# Patient Record
Sex: MALE | Race: WHITE | NOT HISPANIC OR LATINO | Employment: FULL TIME | URBAN - METROPOLITAN AREA
[De-identification: names, ages, dates, MRNs, and addresses within clinical notes are randomized per-mention and may not be internally consistent; named-entity substitution may affect disease eponyms.]

---

## 2023-06-14 ENCOUNTER — HOSPITAL ENCOUNTER (EMERGENCY)
Facility: HOSPITAL | Age: 43
Discharge: OTHER INSTITUTION | End: 2023-06-14
Attending: EMERGENCY MEDICINE
Payer: OTHER MISCELLANEOUS

## 2023-06-14 ENCOUNTER — APPOINTMENT (OUTPATIENT)
Dept: GENERAL RADIOLOGY | Facility: HOSPITAL | Age: 43
End: 2023-06-14
Payer: OTHER MISCELLANEOUS

## 2023-06-14 VITALS
RESPIRATION RATE: 16 BRPM | HEIGHT: 69 IN | TEMPERATURE: 98.3 F | WEIGHT: 135 LBS | BODY MASS INDEX: 19.99 KG/M2 | HEART RATE: 74 BPM | OXYGEN SATURATION: 99 % | SYSTOLIC BLOOD PRESSURE: 128 MMHG | DIASTOLIC BLOOD PRESSURE: 98 MMHG

## 2023-06-14 DIAGNOSIS — S20.311A ABRASION OF RIGHT CHEST WALL, INITIAL ENCOUNTER: ICD-10-CM

## 2023-06-14 DIAGNOSIS — S71.111A LACERATION OF RIGHT THIGH, INITIAL ENCOUNTER: ICD-10-CM

## 2023-06-14 DIAGNOSIS — S01.81XA FACIAL LACERATION, INITIAL ENCOUNTER: Primary | ICD-10-CM

## 2023-06-14 DIAGNOSIS — S09.93XA DENTAL TRAUMA, INITIAL ENCOUNTER: ICD-10-CM

## 2023-06-14 PROCEDURE — 63710000001 ONDANSETRON ODT 4 MG TABLET DISPERSIBLE: Performed by: PHYSICIAN ASSISTANT

## 2023-06-14 PROCEDURE — 99284 EMERGENCY DEPT VISIT MOD MDM: CPT

## 2023-06-14 PROCEDURE — 73560 X-RAY EXAM OF KNEE 1 OR 2: CPT

## 2023-06-14 RX ORDER — ONDANSETRON 4 MG/1
4 TABLET, ORALLY DISINTEGRATING ORAL ONCE
Status: COMPLETED | OUTPATIENT
Start: 2023-06-14 | End: 2023-06-14

## 2023-06-14 RX ORDER — LIDOCAINE HYDROCHLORIDE 10 MG/ML
10 INJECTION, SOLUTION EPIDURAL; INFILTRATION; INTRACAUDAL; PERINEURAL ONCE
Status: COMPLETED | OUTPATIENT
Start: 2023-06-14 | End: 2023-06-14

## 2023-06-14 RX ORDER — HYDROCODONE BITARTRATE AND ACETAMINOPHEN 5; 325 MG/1; MG/1
1 TABLET ORAL ONCE
Status: COMPLETED | OUTPATIENT
Start: 2023-06-14 | End: 2023-06-14

## 2023-06-14 RX ADMIN — ONDANSETRON 4 MG: 4 TABLET, ORALLY DISINTEGRATING ORAL at 08:34

## 2023-06-14 RX ADMIN — LIDOCAINE HYDROCHLORIDE 10 ML: 10 INJECTION, SOLUTION EPIDURAL; INFILTRATION; INTRACAUDAL; PERINEURAL at 07:33

## 2023-06-14 RX ADMIN — HYDROCODONE BITARTRATE AND ACETAMINOPHEN 1 TABLET: 5; 325 TABLET ORAL at 08:33

## 2023-06-14 NOTE — ED PROVIDER NOTES
Subjective   History of Present Illness  Mr. Brizuela is a 42 year old male who presents to our ED after a traumatic injury to his face and right thigh.  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  The metal louvre came loose abruptly and fell onto him, causing lacerations to the left side of the face, a traumatic extraction of his left upper incisor tooth, upper and lower lip and also to his right distal thigh.  He also sustained an abrasion to the right chest.  No loss of consciousness.  No headache or neck pain.  No chest pain or shortness of breath.  He states he is up to date on his tetanus.  Pt has a hx of hepatitis C secondary to prior IV drug use but no current use for several years.    Review of Systems   HENT:  Positive for dental problem (traumatic extraction of left upper lateral incisor). Negative for nosebleeds.    Eyes:  Negative for pain.   Respiratory:  Negative for shortness of breath.    Cardiovascular:  Negative for chest pain.   Gastrointestinal:  Negative for abdominal pain and nausea.   Musculoskeletal:  Negative for back pain and neck pain.   Skin:  Positive for wound (facial lacerations, right chest abrasion, right thigh laceration.).   Allergic/Immunologic: Negative for immunocompromised state.   Neurological:  Negative for numbness and headaches.   Hematological: Negative.    Psychiatric/Behavioral: Negative.       No past medical history on file.    No Known Allergies    No past surgical history on file.    No family history on file.    Social History     Socioeconomic History    Marital status: Single           Objective   Physical Exam  Constitutional:       Appearance: He is not diaphoretic.   HENT:      Head:      Comments: 2.5 cm laceration to left upper lip that extends just to the vermilion border but does not cross it.  1.5 cm laceration to left lower lip that crosses the vermilion  border.   Left upper lateral incisor is extracted and hanging from a small tuft of gum with overlying gum laceration.         Nose: Nose normal.   Eyes:      Extraocular Movements: Extraocular movements intact.      Pupils: Pupils are equal, round, and reactive to light.   Cardiovascular:      Rate and Rhythm: Normal rate and regular rhythm.      Pulses: Normal pulses.   Pulmonary:      Effort: Pulmonary effort is normal.      Breath sounds: Normal breath sounds.      Comments: Linear abrasion to left upper chest with no underlying tenderness or crepitus.    Chest:      Chest wall: No tenderness.   Abdominal:      Tenderness: There is no abdominal tenderness.   Musculoskeletal:      Cervical back: Neck supple. No tenderness.      Comments: 7.5 cm laceration/avulsion across distal right thigh extending into subcutaneous tissue.  No active bleeding.     Skin:     General: Skin is warm and dry.   Neurological:      General: No focal deficit present.      Mental Status: He is alert and oriented to person, place, and time.   Psychiatric:         Mood and Affect: Mood normal.       Procedures           ED Course      In summary, 42 yr old male  at our facility presents with facial lacerations, traumatic dental extraction and right thigh laceration after a heavy metal louvre fell onto him while working today.  No LOC.  No headache or neck pain.  No chest pain or SOA.  UTD on tetatus.    MDM:  Pt has a traumatic dental extraction in the left upper lateral incisor that is hanging in the mouth by a piece of gum tissue with overlying gum laceration.  There are associated facial lacerations, one of which crosses vermilion border on lower lip.  I think it would be optimal to have [facility] take a look at him but that is unavailable here.  I will talk with [facility] to see if they may be able to accept him in transfer.      I spoke with Dr. Jonathan Bonilla, transfer physician at , who agrees to accept pt in transfer.   He advised to just dress the wounds and they would take care of them there.    The dangling tooth was gently removed from the piece of gum tissue holding it and placed in a container with a small amount of milk in it to send with the pt to .  Will see if our ground ambulance can transport him.                                             Medical Decision Making  Risk  Prescription drug management.        Final diagnoses:   Facial laceration, initial encounter   Dental trauma, initial encounter   Laceration of right thigh, initial encounter   Abrasion of right chest wall, initial encounter       ED Disposition  ED Disposition       ED Disposition   Transfer to Another Facility     Condition   --    Comment    ED                 No follow-up provider specified.       Medication List      No changes were made to your prescriptions during this visit.            Link Nichols, PA  06/14/23 0809